# Patient Record
Sex: FEMALE | Race: WHITE | Employment: UNEMPLOYED | URBAN - NONMETROPOLITAN AREA
[De-identification: names, ages, dates, MRNs, and addresses within clinical notes are randomized per-mention and may not be internally consistent; named-entity substitution may affect disease eponyms.]

---

## 2018-02-12 ENCOUNTER — APPOINTMENT (OUTPATIENT)
Dept: CT IMAGING | Age: 28
End: 2018-02-12

## 2018-02-12 ENCOUNTER — HOSPITAL ENCOUNTER (EMERGENCY)
Age: 28
Discharge: ANOTHER HOSPITAL | End: 2018-02-12
Attending: EMERGENCY MEDICINE

## 2018-02-12 ENCOUNTER — APPOINTMENT (OUTPATIENT)
Dept: GENERAL RADIOLOGY | Age: 28
End: 2018-02-12

## 2018-02-12 VITALS
OXYGEN SATURATION: 92 % | HEART RATE: 120 BPM | DIASTOLIC BLOOD PRESSURE: 69 MMHG | SYSTOLIC BLOOD PRESSURE: 108 MMHG | RESPIRATION RATE: 29 BRPM | TEMPERATURE: 98.2 F

## 2018-02-12 DIAGNOSIS — I46.9 CARDIOPULMONARY ARREST WITH SUCCESSFUL RESUSCITATION (HCC): Primary | ICD-10-CM

## 2018-02-12 LAB
ABO: NORMAL
ALBUMIN SERPL-MCNC: 4.3 G/DL (ref 3.5–5.1)
ALLEN TEST: POSITIVE
ALP BLD-CCNC: 157 U/L (ref 38–126)
ALT SERPL-CCNC: 21 U/L (ref 11–66)
ANION GAP SERPL CALCULATED.3IONS-SCNC: 26 MEQ/L (ref 8–16)
ANISOCYTOSIS: ABNORMAL
ANTIBODY SCREEN: NORMAL
APTT: 60.6 SECONDS (ref 22–38)
AST SERPL-CCNC: 42 U/L (ref 5–40)
BANDED NEUTROPHILS ABSOLUTE COUNT: 0.2 THOU/MM3
BANDS PRESENT: 3 %
BASE EXCESS (CALCULATED): -15.6 MMOL/L (ref -2.5–2.5)
BASOPHILIA: SLIGHT
BASOPHILS # BLD: 0 %
BASOPHILS ABSOLUTE: 0 THOU/MM3 (ref 0–0.1)
BILIRUB SERPL-MCNC: 1.8 MG/DL (ref 0.3–1.2)
BILIRUBIN DIRECT: 0.9 MG/DL (ref 0–0.3)
BUN BLDV-MCNC: 28 MG/DL (ref 7–22)
CALCIUM IONIZED SERUM: 1.38 MMOL/L (ref 1.12–1.32)
CALCIUM SERPL-MCNC: 10 MG/DL (ref 8.5–10.5)
CHLORIDE BLD-SCNC: 89 MEQ/L (ref 98–111)
CO2: 20 MEQ/L (ref 23–33)
COLLECTED BY:: ABNORMAL
CREAT SERPL-MCNC: 1.3 MG/DL (ref 0.4–1.2)
CRENATED RBC'S: ABNORMAL
D-DIMER QUANTITATIVE: ABNORMAL NG/ML FEU (ref 0–500)
DEVICE: ABNORMAL
DIFFERENTIAL, MANUAL: NORMAL
DIGOXIN LEVEL: 0.6 NG/ML (ref 0.5–2)
EKG ATRIAL RATE: 96 BPM
EKG P-R INTERVAL: 382 MS
EKG Q-T INTERVAL: 364 MS
EKG QRS DURATION: 188 MS
EKG QTC CALCULATION (BAZETT): 496 MS
EKG R AXIS: 102 DEGREES
EKG T AXIS: 62 DEGREES
EKG VENTRICULAR RATE: 112 BPM
ELLIPTOCYTES: ABNORMAL
EOSINOPHIL # BLD: 1 %
EOSINOPHILS ABSOLUTE: 0.1 THOU/MM3 (ref 0–0.4)
GFR SERPL CREATININE-BSD FRML MDRD: 49 ML/MIN/1.73M2
GLUCOSE BLD-MCNC: 136 MG/DL (ref 70–108)
HCO3: 12 MMOL/L (ref 23–28)
HCT VFR BLD CALC: 43 % (ref 37–47)
HEMOGLOBIN: 13.3 GM/DL (ref 12–16)
IFIO2: 100
INR BLD: 1.51 (ref 0.85–1.13)
LYMPHOCYTES # BLD: 35 %
LYMPHOCYTES ABSOLUTE: 2.7 THOU/MM3 (ref 1–4.8)
MCH RBC QN AUTO: 28.9 PG (ref 27–31)
MCHC RBC AUTO-ENTMCNC: 31 GM/DL (ref 33–37)
MCV RBC AUTO: 93.3 FL (ref 81–99)
MODE: AC
MONOCYTES # BLD: 6 %
MONOCYTES ABSOLUTE: 0.5 THOU/MM3 (ref 0.4–1.3)
NUCLEATED RED BLOOD CELLS: 0 /100 WBC
O2 SATURATION: 94 %
OSMOLALITY CALCULATION: 277.7 MOSMOL/KG (ref 275–300)
PATHOLOGIST REVIEW: ABNORMAL
PCO2: 34 MMHG (ref 35–45)
PDW BLD-RTO: 22.2 % (ref 11.5–14.5)
PH BLOOD GAS: 7.16 (ref 7.35–7.45)
PLATELET # BLD: 59 THOU/MM3 (ref 130–400)
PLATELET ESTIMATE: ABNORMAL
PMV BLD AUTO: 9.9 FL (ref 7.4–10.4)
PO2: 88 MMHG (ref 71–104)
POIKILOCYTES: ABNORMAL
POTASSIUM SERPL-SCNC: 3.1 MEQ/L (ref 3.5–5.2)
POTASSIUM, WHOLE BLOOD: 3 MEQ/L (ref 3.5–4.9)
PREGNANCY, SERUM: NEGATIVE
RBC # BLD: 4.61 MILL/MM3 (ref 4.2–5.4)
RH FACTOR: NORMAL
SEG NEUTROPHILS: 55 %
SEGMENTED NEUTROPHILS ABSOLUTE COUNT: 4.2 THOU/MM3 (ref 1.8–7.7)
SET PEEP: 8 MMHG
SET RESPIRATORY RATE: 16 BPM
SET TIDAL VOLUME: 450 ML
SODIUM BLD-SCNC: 135 MEQ/L (ref 135–145)
SODIUM, WHOLE BLOOD: 135 MEQ/L (ref 138–146)
SOURCE, BLOOD GAS: ABNORMAL
TARGET CELLS: ABNORMAL
TOTAL PROTEIN: 6.7 G/DL (ref 6.1–8)
TROPONIN T: 0.02 NG/ML
WBC # BLD: 7.7 THOU/MM3 (ref 4.8–10.8)

## 2018-02-12 PROCEDURE — 31500 INSERT EMERGENCY AIRWAY: CPT

## 2018-02-12 PROCEDURE — 86850 RBC ANTIBODY SCREEN: CPT

## 2018-02-12 PROCEDURE — 93010 ELECTROCARDIOGRAM REPORT: CPT | Performed by: INTERNAL MEDICINE

## 2018-02-12 PROCEDURE — 36415 COLL VENOUS BLD VENIPUNCTURE: CPT

## 2018-02-12 PROCEDURE — 85730 THROMBOPLASTIN TIME PARTIAL: CPT

## 2018-02-12 PROCEDURE — 2720000010 HC SURG SUPPLY STERILE

## 2018-02-12 PROCEDURE — 86900 BLOOD TYPING SEROLOGIC ABO: CPT

## 2018-02-12 PROCEDURE — 93005 ELECTROCARDIOGRAM TRACING: CPT | Performed by: EMERGENCY MEDICINE

## 2018-02-12 PROCEDURE — 86901 BLOOD TYPING SEROLOGIC RH(D): CPT

## 2018-02-12 PROCEDURE — 71275 CT ANGIOGRAPHY CHEST: CPT

## 2018-02-12 PROCEDURE — 96368 THER/DIAG CONCURRENT INF: CPT

## 2018-02-12 PROCEDURE — 85025 COMPLETE CBC W/AUTO DIFF WBC: CPT

## 2018-02-12 PROCEDURE — 96365 THER/PROPH/DIAG IV INF INIT: CPT

## 2018-02-12 PROCEDURE — 84703 CHORIONIC GONADOTROPIN ASSAY: CPT

## 2018-02-12 PROCEDURE — 70450 CT HEAD/BRAIN W/O DYE: CPT

## 2018-02-12 PROCEDURE — 82248 BILIRUBIN DIRECT: CPT

## 2018-02-12 PROCEDURE — 74018 RADEX ABDOMEN 1 VIEW: CPT

## 2018-02-12 PROCEDURE — 84484 ASSAY OF TROPONIN QUANT: CPT

## 2018-02-12 PROCEDURE — 99291 CRITICAL CARE FIRST HOUR: CPT

## 2018-02-12 PROCEDURE — 2500000003 HC RX 250 WO HCPCS

## 2018-02-12 PROCEDURE — 99292 CRITICAL CARE ADDL 30 MIN: CPT

## 2018-02-12 PROCEDURE — 85610 PROTHROMBIN TIME: CPT

## 2018-02-12 PROCEDURE — 2580000003 HC RX 258: Performed by: EMERGENCY MEDICINE

## 2018-02-12 PROCEDURE — 80162 ASSAY OF DIGOXIN TOTAL: CPT

## 2018-02-12 PROCEDURE — 85379 FIBRIN DEGRADATION QUANT: CPT

## 2018-02-12 PROCEDURE — 92950 HEART/LUNG RESUSCITATION CPR: CPT

## 2018-02-12 PROCEDURE — 2700000000 HC OXYGEN THERAPY PER DAY

## 2018-02-12 PROCEDURE — 36600 WITHDRAWAL OF ARTERIAL BLOOD: CPT

## 2018-02-12 PROCEDURE — 84295 ASSAY OF SERUM SODIUM: CPT

## 2018-02-12 PROCEDURE — 2500000003 HC RX 250 WO HCPCS: Performed by: EMERGENCY MEDICINE

## 2018-02-12 PROCEDURE — 80053 COMPREHEN METABOLIC PANEL: CPT

## 2018-02-12 PROCEDURE — 82803 BLOOD GASES ANY COMBINATION: CPT

## 2018-02-12 PROCEDURE — 96366 THER/PROPH/DIAG IV INF ADDON: CPT

## 2018-02-12 PROCEDURE — 36556 INSERT NON-TUNNEL CV CATH: CPT

## 2018-02-12 PROCEDURE — 6360000002 HC RX W HCPCS: Performed by: EMERGENCY MEDICINE

## 2018-02-12 PROCEDURE — 71045 X-RAY EXAM CHEST 1 VIEW: CPT

## 2018-02-12 PROCEDURE — 84132 ASSAY OF SERUM POTASSIUM: CPT

## 2018-02-12 PROCEDURE — 82330 ASSAY OF CALCIUM: CPT

## 2018-02-12 PROCEDURE — A4212 NON CORING NEEDLE OR STYLET: HCPCS

## 2018-02-12 PROCEDURE — 6360000004 HC RX CONTRAST MEDICATION: Performed by: EMERGENCY MEDICINE

## 2018-02-12 PROCEDURE — 96375 TX/PRO/DX INJ NEW DRUG ADDON: CPT

## 2018-02-12 RX ORDER — LEVOFLOXACIN 5 MG/ML
500 INJECTION, SOLUTION INTRAVENOUS ONCE
Status: COMPLETED | OUTPATIENT
Start: 2018-02-12 | End: 2018-02-12

## 2018-02-12 RX ORDER — 0.9 % SODIUM CHLORIDE 0.9 %
1000 INTRAVENOUS SOLUTION INTRAVENOUS ONCE
Status: COMPLETED | OUTPATIENT
Start: 2018-02-12 | End: 2018-02-12

## 2018-02-12 RX ORDER — CALCIUM CHLORIDE 100 MG/ML
INJECTION INTRAVENOUS; INTRAVENTRICULAR DAILY PRN
Status: COMPLETED | OUTPATIENT
Start: 2018-02-12 | End: 2018-02-12

## 2018-02-12 RX ADMIN — EPINEPHRINE 1 MG: 0.1 INJECTION, SOLUTION ENDOTRACHEAL; INTRACARDIAC; INTRAVENOUS at 02:22

## 2018-02-12 RX ADMIN — SODIUM CHLORIDE 1000 ML: 9 INJECTION, SOLUTION INTRAVENOUS at 04:00

## 2018-02-12 RX ADMIN — IOPAMIDOL 80 ML: 755 INJECTION, SOLUTION INTRAVENOUS at 04:39

## 2018-02-12 RX ADMIN — EPINEPHRINE 1 MG: 0.1 INJECTION, SOLUTION ENDOTRACHEAL; INTRACARDIAC; INTRAVENOUS at 02:24

## 2018-02-12 RX ADMIN — AZTREONAM 1 G: 1 INJECTION, POWDER, LYOPHILIZED, FOR SOLUTION INTRAMUSCULAR; INTRAVENOUS at 05:45

## 2018-02-12 RX ADMIN — SODIUM CHLORIDE 1000 ML: 9 INJECTION, SOLUTION INTRAVENOUS at 02:15

## 2018-02-12 RX ADMIN — SODIUM CHLORIDE 1000 ML: 9 INJECTION, SOLUTION INTRAVENOUS at 05:45

## 2018-02-12 RX ADMIN — Medication 50 MEQ: at 02:21

## 2018-02-12 RX ADMIN — CALCIUM CHLORIDE 1 G: 100 INJECTION, SOLUTION INTRAVENOUS at 02:19

## 2018-02-12 RX ADMIN — Medication 5 MCG/MIN: at 03:37

## 2018-02-12 RX ADMIN — EPINEPHRINE 1 MG: 0.1 INJECTION, SOLUTION ENDOTRACHEAL; INTRACARDIAC; INTRAVENOUS at 02:16

## 2018-02-12 RX ADMIN — LEVOFLOXACIN 500 MG: 5 INJECTION, SOLUTION INTRAVENOUS at 05:46

## 2018-02-12 ASSESSMENT — PULMONARY FUNCTION TESTS: PIF_VALUE: 16

## 2018-02-12 NOTE — ED NOTES
Pulse check and CPR resumed. Epi given.        [address] [place] [address]  02/12/18 1785

## 2018-02-12 NOTE — ED NOTES
Bed: 001A  Expected date: 2/12/18  Expected time:   Means of arrival: [facility]  Comments:     Shirley Gee  02/12/18 0214

## 2018-02-12 NOTE — ED PROVIDER NOTES
discharge and no exudate. No scleral icterus. Pupils are fixed and dilated at 4 mm   Neck: Trachea normal and normal range of motion. Neck supple. No hepatojugular reflux and no JVD present. No spinous process tenderness and no muscular tenderness present. No neck rigidity. No tracheal deviation, no edema and normal range of motion present. Cardiovascular: Normal heart sounds and intact distal pulses. Patient in cardiac arrest on Howard Salmeron device no spontaneous pulsation   Pulmonary/Chest: Effort normal. She is intubated. She has decreased breath sounds in the right lower field and the left lower field. She has no wheezes. She has no rhonchi. She has no rales. Large central line incision, bruising from the Mastic Salmeron device. Abdominal: Soft. Bowel sounds are normal. She exhibits distension. She exhibits no mass. Musculoskeletal: Normal range of motion. She exhibits no edema or tenderness. Neurological: She has normal reflexes. She is unresponsive. GCS eye subscore is 1. GCS verbal subscore is 1. GCS motor subscore is 1. Patient in cardiopulmonary arrest.  Unable to elicit any spontaneous reactions. Skin: Skin is warm and dry. No rash noted. Nursing note and vitals reviewed. DIFFERENTIAL DIAGNOSIS:   Differential diagnoses were discussed extensively with the patient and family including but no limited to Cardiac pulmonary arrest, chest pain ACS, STEMI, and PE, ventricular rupture, pericardial tamponade    DIAGNOSTIC RESULTS     EKG: All EKG's are interpreted by the Emergency Department Physician who either signs or Co-signs this chart in the absence of a cardiologist.  EKG revealed atrial paced rhythm with ventricular rate of 112 a MN interval of 382 QRS duration of 188 QT of 364, and a QTC of 496. No old EKGs to refer to. RADIOLOGY: non-plain film images(s) such as CT, Ultrasound and MRI are read by the radiologist.    CTA [address]   Final Result   1.    Negative for pulmonary arterial emboli. 2.  Diffuse groundglass opacities consistent with diffuse pulmonary edema. 3.  Patchy parenchymal infiltrates in the superior segment of the left lower lobe possibly secondary to aspiration. **This report has been created using voice recognition software. It may contain minor errors which are inherent in voice recognition technology. **      Final report electronically signed by Dr. Susannah Malik on 2/12/2018 5:10 AM      CT HEAD WO CONTRAST   Final Result   1. No acute intracranial hemorrhage, infarction, or mass. 2.  The gray-white matter differentiation is preserved. 3.  Mucosal thickening in the paranasal sinuses. **This report has been created using voice recognition software. It may contain minor errors which are inherent in voice recognition technology. **      Final report electronically signed by Dr. Susannah Malik on 2/12/2018 5:02 AM      XR ABDOMEN (KUB) (SINGLE AP VIEW)   Final Result   1. There is a right femoral vein central venous catheter with the tip extending up to at least T12.   2.  Mild gaseous distention of the colon and the stomach. *This report has been created using voice recognition software. It may contain minor errors which are inherent in voice recognition technology. **      Final report electronically signed by Dr. Susannah Malik on 2/12/2018 3:30 AM      XR CHEST PORTABLE   Final Result   1. The tip of endotracheal tube 5.5 cm above the eulalia. 2.  The sidehole of the NG tube is at the gastroesophageal junction. Recommend advancing the NG tube at least 10 cm. 3.  Mild interstitial pulmonary edema. 4.  Right hilar opacity could represent infiltrate from pneumonia or could represent a mass. **This report has been created using voice recognition software. It may contain minor errors which are inherent in voice recognition technology. **      Final report electronically signed by Dr. Susannah Malik on